# Patient Record
Sex: FEMALE | Race: WHITE | Employment: FULL TIME | URBAN - METROPOLITAN AREA
[De-identification: names, ages, dates, MRNs, and addresses within clinical notes are randomized per-mention and may not be internally consistent; named-entity substitution may affect disease eponyms.]

---

## 2017-03-13 ENCOUNTER — TELEPHONE (OUTPATIENT)
Dept: NURSING | Facility: CLINIC | Age: 57
End: 2017-03-13

## 2017-03-13 DIAGNOSIS — R68.82 LOW LIBIDO: Primary | ICD-10-CM

## 2017-03-13 NOTE — TELEPHONE ENCOUNTER
Spoke with Josie Rosales in person, she stated she ok's the 3 month supply and stated it was for low libido. Rx added to pt's med list.

## 2017-03-13 NOTE — TELEPHONE ENCOUNTER
Testosterone 4mg + DHEA 30mg/gm cream (1/2gram or 10 clicks once a day in the morning)      Last Written Prescription Date: Not in pt's electronic chart, pharmacist stated old Rx dated 9/15/16  Last Fill Quantity: 1 month,   # refills: 3 refills  Last Office Visit with [facility] [place] care provider:  5/9/16  Future Office visit: 5/12/17    Heather Callback: [phone] ext [phone] ok to  is a secure voicemail    Routing refill request to provider for review/approval because:  45g is 3 month supply. Pt is leaving the country tomorrow and needs this refill today if able. Note routed to Josie Huff to refill for pt for 3 month supply?

## 2017-05-12 ENCOUNTER — OFFICE VISIT (OUTPATIENT)
Dept: OBGYN | Facility: CLINIC | Age: 57
End: 2017-05-12
Payer: COMMERCIAL

## 2017-05-12 ENCOUNTER — RADIANT APPOINTMENT (OUTPATIENT)
Dept: MAMMOGRAPHY | Facility: CLINIC | Age: 57
End: 2017-05-12
Payer: COMMERCIAL

## 2017-05-12 VITALS
HEIGHT: 63 IN | BODY MASS INDEX: 24.98 KG/M2 | DIASTOLIC BLOOD PRESSURE: 74 MMHG | SYSTOLIC BLOOD PRESSURE: 112 MMHG | WEIGHT: 141 LBS

## 2017-05-12 DIAGNOSIS — Z12.31 VISIT FOR SCREENING MAMMOGRAM: ICD-10-CM

## 2017-05-12 DIAGNOSIS — Z13.29 SCREENING FOR THYROID DISORDER: ICD-10-CM

## 2017-05-12 DIAGNOSIS — Z01.419 ENCOUNTER FOR GYNECOLOGICAL EXAMINATION WITHOUT ABNORMAL FINDING: Primary | ICD-10-CM

## 2017-05-12 DIAGNOSIS — H69.93 EUSTACHIAN TUBE DYSFUNCTION, BILATERAL: ICD-10-CM

## 2017-05-12 DIAGNOSIS — Z13.220 ENCOUNTER FOR LIPID SCREENING FOR CARDIOVASCULAR DISEASE: ICD-10-CM

## 2017-05-12 DIAGNOSIS — Z13.228 SCREENING FOR METABOLIC DISORDER: ICD-10-CM

## 2017-05-12 DIAGNOSIS — R68.82 LOW LIBIDO: ICD-10-CM

## 2017-05-12 DIAGNOSIS — Z13.6 ENCOUNTER FOR LIPID SCREENING FOR CARDIOVASCULAR DISEASE: ICD-10-CM

## 2017-05-12 DIAGNOSIS — Z79.890 POST-MENOPAUSE ON HRT (HORMONE REPLACEMENT THERAPY): ICD-10-CM

## 2017-05-12 DIAGNOSIS — M85.80 OSTEOPENIA, SENILE: ICD-10-CM

## 2017-05-12 LAB
ALBUMIN SERPL-MCNC: 4.4 G/DL (ref 3.4–5)
ALP SERPL-CCNC: 61 U/L (ref 40–150)
ALT SERPL W P-5'-P-CCNC: 38 U/L (ref 0–50)
ANION GAP SERPL CALCULATED.3IONS-SCNC: 7 MMOL/L (ref 3–14)
AST SERPL W P-5'-P-CCNC: 25 U/L (ref 0–45)
BILIRUB SERPL-MCNC: 0.7 MG/DL (ref 0.2–1.3)
BUN SERPL-MCNC: 15 MG/DL (ref 7–30)
CALCIUM SERPL-MCNC: 9.2 MG/DL (ref 8.5–10.1)
CHLORIDE SERPL-SCNC: 106 MMOL/L (ref 94–109)
CHOLEST SERPL-MCNC: 255 MG/DL
CO2 SERPL-SCNC: 26 MMOL/L (ref 20–32)
CREAT SERPL-MCNC: 0.69 MG/DL (ref 0.52–1.04)
GFR SERPL CREATININE-BSD FRML MDRD: 87 ML/MIN/1.7M2
GLUCOSE SERPL-MCNC: 96 MG/DL (ref 70–99)
HDLC SERPL-MCNC: 79 MG/DL
LDLC SERPL CALC-MCNC: 158 MG/DL
NONHDLC SERPL-MCNC: 176 MG/DL
POTASSIUM SERPL-SCNC: 4.3 MMOL/L (ref 3.4–5.3)
PROT SERPL-MCNC: 8.1 G/DL (ref 6.8–8.8)
SODIUM SERPL-SCNC: 139 MMOL/L (ref 133–144)
TRIGL SERPL-MCNC: 88 MG/DL
TSH SERPL DL<=0.005 MIU/L-ACNC: 0.75 MU/L (ref 0.4–4)

## 2017-05-12 PROCEDURE — 99396 PREV VISIT EST AGE 40-64: CPT | Performed by: OBSTETRICS & GYNECOLOGY

## 2017-05-12 PROCEDURE — 80061 LIPID PANEL: CPT | Performed by: OBSTETRICS & GYNECOLOGY

## 2017-05-12 PROCEDURE — G0202 SCR MAMMO BI INCL CAD: HCPCS | Mod: TC

## 2017-05-12 PROCEDURE — 80053 COMPREHEN METABOLIC PANEL: CPT | Performed by: OBSTETRICS & GYNECOLOGY

## 2017-05-12 PROCEDURE — 84443 ASSAY THYROID STIM HORMONE: CPT | Performed by: OBSTETRICS & GYNECOLOGY

## 2017-05-12 PROCEDURE — 36415 COLL VENOUS BLD VENIPUNCTURE: CPT | Performed by: OBSTETRICS & GYNECOLOGY

## 2017-05-12 ASSESSMENT — ANXIETY QUESTIONNAIRES
2. NOT BEING ABLE TO STOP OR CONTROL WORRYING: NOT AT ALL
7. FEELING AFRAID AS IF SOMETHING AWFUL MIGHT HAPPEN: NOT AT ALL
GAD7 TOTAL SCORE: 0
5. BEING SO RESTLESS THAT IT IS HARD TO SIT STILL: NOT AT ALL
6. BECOMING EASILY ANNOYED OR IRRITABLE: NOT AT ALL
IF YOU CHECKED OFF ANY PROBLEMS ON THIS QUESTIONNAIRE, HOW DIFFICULT HAVE THESE PROBLEMS MADE IT FOR YOU TO DO YOUR WORK, TAKE CARE OF THINGS AT HOME, OR GET ALONG WITH OTHER PEOPLE: NOT DIFFICULT AT ALL
3. WORRYING TOO MUCH ABOUT DIFFERENT THINGS: NOT AT ALL
1. FEELING NERVOUS, ANXIOUS, OR ON EDGE: NOT AT ALL

## 2017-05-12 ASSESSMENT — PATIENT HEALTH QUESTIONNAIRE - PHQ9: 5. POOR APPETITE OR OVEREATING: NOT AT ALL

## 2017-05-12 NOTE — PROGRESS NOTES
Elva is a 57 year old (+4) female who presents for annual exam.     Besides routine health maintenance, she would like to discuss bilateral pressure in ears and has had this for several months. Had full workup with ENT approx. 2 years ago.    HPI:    The patient does not have a primary care provider.    Patient has been seeing Dr. Ivey for years but all of her sisters and some nieces come to see me so finally transitioned to me when our clinics merged.  Patient has been on compounded HRT for several years. Struggled for 3 yrs with nightsweats and insomnia and low libido. Within 2 days of starting these creams she was sleeping through the night and felt like a new person. Was going to compound Rx with [facility] but that clinic closed down and all the pts were funneled to a compounding pharmacy in [place]. uche has filled her compound rx for her and the pharmacy just contacts him. Patient does her 2 estrogen/progesterone cream BID (5 clicks) and her DHEA/testosterone cream once a day. Doesn't need refills now but if contacted would like to know if I would fill that for her.  Patient has had episodes in the past where will get a really full feeling in her ears and head. Feels like wants to pop her ears. Sometimes will get the to pop but not really any relief from it. Saw ent a few years ago and nothing found. Then has been fine for a couple of years and now started again recently. Has always just had a fear of a brain tumor and so when she gets these sx it's all she can think about.  Has mild osteopenia on her last dexa 2 yrs ago. Dr. ivey recommended she do one every other year so should be due. Wondering if needs to do that or not.  Fasting for lab work today. Her last cholesterol was deemed high but in reality was normal. Patient states she was never told that fasting labs are more sensitive and so has never fasted for choleterol before until today.  Patient has a derm appointment after this. Does a  deep dermabrasion every few months that works really well but is very bloody and sore for about a week after it.    GYNECOLOGIC HISTORY:    No LMP recorded. Patient is postmenopausal.  She  reports that she has never smoked. She does not have any smokeless tobacco history on file.  Patient is sexually active.  STD testing offered?  Declined    Last PHQ-9 score on record =   PHQ-9 SCORE 2017   Total Score 0     Last GAD7 score on record =   GENNY-7 SCORE 2017   Total Score 0     Alcohol Score = 3    HEALTH MAINTENANCE:  Cholesterol: fasting today for labs  Cholesterol   Date Value Ref Range Status   2017 255 (H) <200 mg/dL Final     Comment:     Desirable:       <200 mg/dl   2016 198 <200 mg/dL Final   Last Mammo: 16, Result: normal, Next Mammo: today   Pap:  Lab Results   Component Value Date    PAP NIL 2016    PAP NIL 2015   Colonoscopy:  , Result: normal, Next Colonoscopy:   Dexa:  4/30/15  Mild Osteopenia  Spine -1.3  Left -1.0  Right -1.3  Health maintenance updated:  yes    HISTORY:  Obstetric History       T0      TAB0   SAB0   E0   M0   L4       # Outcome Date GA Lbr Jayden/2nd Weight Sex Delivery Anes PTL Lv   4 Para      Vag-Spont   Y   3 Para         Y   2 Para         Y   1 Para         Y      Obstetric Comments   Has four kids of her own. Has one stepdaughter from her first  that was 5 when they got together and is her oldest and still very involved with her. Has 3 stepkids from her current . Remarried when she was 45       Patient Active Problem List   Diagnosis     Post-menopause on HRT (hormone replacement therapy)     Osteopenia, senile     Past Surgical History:   Procedure Laterality Date     ABDOMINOPLASTY  2009     COSMETIC BLEPHAROPLASTY FOUR LIDS       SLING TRANSVAGINAL N/A 10/16/2014    Dr Abdul      Social History   Substance Use Topics     Smoking status: Never Smoker     Smokeless tobacco: Not on file  "    Alcohol use 3.5 oz/week     7 Standard drinks or equivalent per week      Problem (# of Occurrences) Relation (Name,Age of Onset)    Breast Cancer (1) Maternal Grandmother    CANCER (1) Sister: GI    DIABETES (2) Mother, Father            Current Outpatient Prescriptions   Medication Sig     NEW MED Estriol 1.6mg / Estradiol 1.6mg / Progesterone 200mg (2x) / Testosterone 4mg: twice daily, 5 clicks.     NEW MED Testosterone 4mg + DHEA 30mg/gm cream   1/2gram or 10 clicks once a day in the morning     calcium carbonate (OS-NOEL 500 MG [place]. [place]) 500 MG tablet Take 500 mg by mouth daily     Krill Oil 1000 MG CAPS Take 1 capsule by mouth daily     Ceres 550 MG CAPS Take 1 capsule by mouth daily     MULTIVITAMINS OR TABS ONE DAILY     BONE DENSITY 300-200 MG-UNIT OR TABS 2 Qd     No current facility-administered medications for this visit.      No Known Allergies    Past medical, surgical, social and family histories were reviewed and updated in EPIC.    ROS:   12 point review of systems negative other than symptoms noted below.  Head: Earache and Sore Throat  Musculoskeletal: Joint Pain    EXAM:  /74  Ht 5' 3\" (1.6 m)  Wt 141 lb (64 kg)  BMI 24.98 kg/m2   BMI: Body mass index is 24.98 kg/(m^2).    PHYSICAL EXAM:  Constitutional:  Appearance: Well nourished, well developed, alert, in no acute distress  Neck:  Lymph Nodes:  No lymphadenopathy present    Thyroid:  Gland size normal, nontender, no nodules or masses present  on palpation  Chest:  Respiratory Effort:  Breathing unlabored  Cardiovascular:    Heart: Auscultation:  Regular rate, normal rhythm, no murmurs present  Breasts: Inspection of Breasts:  No lymphadenopathy present    Palpation of Breasts and Axillae:  No masses present on palpation, no  breast tenderness    Axillary Lymph Nodes:  No lymphadenopathy present  Gastrointestinal:   Abdominal Examination:  Abdomen nontender to palpation, tone normal without rigidity or guarding, no masses " present, umbilicus without lesions   Liver and Spleen:  No hepatomegaly present, liver nontender to palpation    Hernias:  No hernias present  Lymphatic: Lymph Nodes:  No other lymphadenopathy present  Skin:  General Inspection:  No rashes present, no lesions present, no areas of  discoloration    Genitalia and Groin:  No rashes present, no lesions present, no areas of  discoloration, no masses present  Neurologic/Psychiatric:    Mental Status:  Oriented X3     Pelvic Exam:  External Genitalia:     Normal appearance for age, no discharge present, no tenderness present, no inflammatory lesions present, color normal  Vagina:     Normal vaginal vault without central or paravaginal defects, no discharge present, no inflammatory lesions present, no masses present  Bladder:     Nontender to palpation  Urethra:   Urethral Body:  Urethra palpation normal, urethra structural support normal   Urethral Meatus:  No erythema or lesions present  Cervix:     Appearance healthy, no lesions present, nontender to palpation, no bleeding present  Uterus:     Nontender to palpation, no masses present, position anteflexed, mobility: normal  Adnexa:     No adnexal tenderness present, no adnexal masses present  Perineum:     Perineum within normal limits, no evidence of trauma, no rashes or skin lesions present  Anus:     Anus within normal limits, no hemorrhoids present  Inguinal Lymph Nodes:     No lymphadenopathy present  Pubic Hair:     Normal pubic hair distribution for age  Genitalia and Groin:     No rashes present, no lesions present, no areas of discoloration, no masses present    COUNSELING:   Reviewed preventive health counseling, as reflected in patient instructions  Special attention given to:        ([place])menopause management    BMI: Body mass index is 24.98 kg/(m^2).      ASSESSMENT:  57 year old female with satisfactory annual exam.    ICD-10-CM    1. Encounter for gynecological examination without abnormal finding Z01.419     2. Post-menopause on HRT (hormone replacement therapy) Z79.890    3. Osteopenia, senile M85.80    4. Eustachian tube dysfunction, bilateral H69.83    5. Low libido R68.82    6. Encounter for lipid screening for cardiovascular disease Z13.220 Lipid panel reflex to direct LDL    Z13.6    7. Screening for metabolic disorder Z13.228 Comprehensive metabolic panel   8. Screening for thyroid disorder Z13.29 TSH with free T4 reflex       PLAN:  Pap is UTD and mammo today  Fasting labs today to see how it compares to her prior nonfasting labs  Discussed her osteopenia as very mild. Don't see a need to do a dexa this year with the T scores as they are. Would recommend about 3-5 years from the last one. She thinks she'd like to repeat it again next year b/c starting a new weight bearing, more intense exercise regimen now and would like to see if that helps at all.  Spent a long time discussing menopause, HRT, compounded hormones, risk/safety. Patient was sure that compounded hormones came only from a yam source and therefore were safe and carried none of the risk of FDA monitored HRT. Patient was informed that this isn't the case we just simply don't have the FDA safety data on compounded HRT. i'm fine with her staying on it since it's really helping and she's happy with it. Also given name of other compounding pharmacies in [place] to get it through so doen't have to mess with going out of state through [place]. Patient needs to be aware that there is risk to all HRT regardless of the type and that as long as she is aware of those risks she can continue with it. Patient would like to do so now that she is aware.    Angy Solis MD

## 2017-05-12 NOTE — MR AVS SNAPSHOT
"              After Visit Summary   5/12/2017    Elva Peters    MRN: [id number]           Patient Information     Date Of Birth          1960        Visit Information        Provider Department      5/12/2017 9:00 AM Angy Solis MD [facility]        Today's Diagnoses     Encounter for gynecological examination without abnormal finding    -  1    Post-menopause on HRT (hormone replacement therapy)        Osteopenia, senile        Eustachian tube dysfunction, bilateral        Low libido        Encounter for lipid screening for cardiovascular disease        Screening for metabolic disorder        Screening for thyroid disorder           Follow-ups after your visit        Who to contact     If you have questions or need follow up information about today's clinic visit or your schedule please contact [facility] directly at [phone].  Normal or non-critical lab and imaging results will be communicated to you by [employer], letter or phone within 4 business days after the clinic has received the results. If you do not hear from us within 7 days, please contact the clinic through [employer] or phone. If you have a critical or abnormal lab result, we will notify you by phone as soon as possible.  Submit refill requests through [employer] or call your pharmacy and they will forward the refill request to us. Please allow 3 business days for your refill to be completed.          Additional Information About Your Visit        MyChart Information     [employer] lets you send messages to your doctor, view your test results, renew your prescriptions, schedule appointments and more. To sign up, go to [email or web address] . Click on \"Log in\" on the left side of the screen, which will take you to the Welcome page. Then click on \"Sign up Now\" on the right side of the page.     You will be asked to enter the access code listed below, as well as some personal information. Please follow the " "directions to create your username and password.     Your access code is: [email or web address]  Expires: 2017  9:50 AM     Your access code will  in 90 days. If you need help or a new code, please call your [facility] clinic or [phone].        Care EveryWhere ID     This is your Care EveryWhere ID. This could be used by other organizations to access your [facility] medical records  [id number]        Your Vitals Were     Height BMI (Body Mass Index)                5' 3\" (1.6 m) 24.98 kg/m2           Blood Pressure from Last 3 Encounters:   17 112/74   16 110/64   04/30/15 110/68    Weight from Last 3 Encounters:   17 141 lb (64 kg)   16 138 lb 3.2 oz (62.7 kg)   04/30/15 140 lb (63.5 kg)              We Performed the Following     Comprehensive metabolic panel     Lipid panel reflex to direct LDL     TSH with free T4 reflex        Primary Care Provider Office Phone # Fax #    Ashok Abdul [phone]       [facility] [address]  [facility] [address]        Thank you!     Thank you for choosing [facility]  for your care. Our goal is always to provide you with excellent care. Hearing back from our patients is one way we can continue to improve our services. Please take a few minutes to complete the written survey that you may receive in the mail after your visit with us. Thank you!             Your Updated Medication List - Protect others around you: Learn how to safely use, store and throw away your medicines at www.disposemymeds.org.          This list is accurate as of: 17 11:59 PM.  Always use your most recent med list.                   Brand Name Dispense Instructions for use    BONE DENSITY 300-200 MG-UNIT Tabs      2 Qd       calcium carbonate 500 MG tablet    OS-NOEL 500 mg [place]. Ca     Take 500 mg by mouth daily       [place] 550 MG Caps      Take 1 capsule by mouth daily       Krill Oil 1000 MG Caps      " Take 1 capsule by mouth daily       multivitamin per tablet     100    ONE DAILY       * NEW MED      Estriol 1.6mg / Estradiol 1.6mg / Progesterone 200mg (2x) / Testosterone 4mg: twice daily, 5 clicks.       * NEW MED     45 g    Testosterone 4mg + DHEA 30mg/gm cream  1/2gram or 10 clicks once a day in the morning       * Notice:  This list has 2 medication(s) that are the same as other medications prescribed for you. Read the directions carefully, and ask your doctor or other care provider to review them with you.

## 2017-05-13 ASSESSMENT — PATIENT HEALTH QUESTIONNAIRE - PHQ9: SUM OF ALL RESPONSES TO PHQ QUESTIONS 1-9: 0

## 2017-05-13 ASSESSMENT — ANXIETY QUESTIONNAIRES: GAD7 TOTAL SCORE: 0

## 2017-05-16 ENCOUNTER — TELEPHONE (OUTPATIENT)
Dept: NURSING | Facility: CLINIC | Age: 57
End: 2017-05-16

## 2017-05-16 DIAGNOSIS — Z86.39 H/O ELEVATED LIPIDS: Primary | ICD-10-CM

## 2017-05-16 NOTE — TELEPHONE ENCOUNTER
Pt informed. Will return for fasting blood work in 4-5 months. Lab order entered.     Notes Recorded by Angy Solis MD on 5/12/2017 at 9:31 PM  Thyroid and metabolic panel look great. Her cholesterol total and LDL are dramatically higher than last year which is unusual b/c she was fasting this year and not last. It seems somewhat atypical for such a steep increase when no other health changes have happened. Let's have her repeat fasting lipids only in 4-5 months and just see if it's more consistent with this year's or last years. If truly elevated we will address at that time.

## 2017-05-23 ENCOUNTER — MYC MEDICAL ADVICE (OUTPATIENT)
Dept: OBGYN | Facility: CLINIC | Age: 57
End: 2017-05-23

## 2017-05-23 NOTE — TELEPHONE ENCOUNTER
Routing pt's Mango DSP message to Dr. Mendoza (on-call) (Dr. Solis in surgery today) to review and advise.

## 2017-05-23 NOTE — TELEPHONE ENCOUNTER
This is a patient I am not familiar with and don't feel I can make the call on who to have her see next so please forward this on to Dr Solis

## 2017-05-24 NOTE — TELEPHONE ENCOUNTER
I'd start with Andrea Crisostomo ENT in our building for more ENT related w/u. If he rules out anything in his perview than I would refer to either Dr. Debo Arguello or Dr. Boateng of neurology ( I think they're different clinics so maybe just check insurance to make sure anyone she sees is in network. I truly don't think anything serious is going on and I don't think it's related to the cholesterol. I really think that these are separate things. But let's start ruling out one thing and then another until we can figure it out.

## 2017-10-31 ENCOUNTER — TELEPHONE (OUTPATIENT)
Dept: OBGYN | Facility: CLINIC | Age: 57
End: 2017-10-31

## 2017-10-31 NOTE — TELEPHONE ENCOUNTER
Pt states [facility] is out of network for her, she states she will still see Dr. Solis for her care but wants labs/mammograms done through [facility]. Pt requested order for cholesterol be faxed to  [facility] fax: [phone].  Order faxed as requested.

## 2017-10-31 NOTE — TELEPHONE ENCOUNTER
Reason for Call:  Other call back    Detailed comments: patient wants her lab orders sent to [facility] because [facility] is out of network    Phone Number Patient can be reached at: Cell number on file:    Telephone Information:   Mobile [phone]       Best Time: today    Can we leave a detailed message on this number? YES    Call taken on 10/31/2017 at 10:57 AM by Adry Montesinos

## 2017-11-16 ENCOUNTER — TRANSFERRED RECORDS (OUTPATIENT)
Dept: HEALTH INFORMATION MANAGEMENT | Facility: CLINIC | Age: 57
End: 2017-11-16

## 2017-11-16 LAB
CHOLEST SERPL-MCNC: 244 MG/DL (ref 0–199)
HDLC SERPL-MCNC: 63 MG/DL
LDLC SERPL CALC-MCNC: 163 MG/DL (ref 19–130)
TRIGL SERPL-MCNC: 88 MG/DL (ref 4–149)

## 2017-11-27 ENCOUNTER — TELEPHONE (OUTPATIENT)
Dept: OBGYN | Facility: CLINIC | Age: 57
End: 2017-11-27

## 2017-11-27 NOTE — TELEPHONE ENCOUNTER
Pt calling stating she had her lab (Lipid Panel) drawn on 11/16/17 at the [facility] in [place] ([phone]) and looking for results. No lab results scanned into media or on lab tab and was not viewable via Care Everywhere but after hanging out telephone call with [facility] the results are now viewable along with Testosterone Lab. Routing to Dr. Solis for review/advise lab results and plan.

## 2017-11-27 NOTE — TELEPHONE ENCOUNTER
Lab results from robyn rosenberg that was sent over to [facility].  Pt has not heard the results yet.     Please call back to discuss.

## 2017-11-28 NOTE — TELEPHONE ENCOUNTER
Pt informed. Pt states she feels she is eating very healthy, never has firied foods and limits carbs. Pt is exercising 6 days per week. Pt agrees with loosing 10 pounds and is working on that. Pt stated Dr. Solis is not in pt's network so pt would rather have labs done at [facility] to save cost. Pt wants to continue to see Dr. Solis,  so has to pay out of pocket. Routing to Dr. Solis. HELENA

## 2017-11-28 NOTE — TELEPHONE ENCOUNTER
Her testosterone is fine so is ok to stay on her current dose.    Her lipids are actually slightly worse. Her HDl or good cholest is good so that's partly what's bumping her total and also helping to make her ratio ok. However her LDL with us was 158 and now is 163. Ideal is under 130. We usually don't do medication at this level, and wait until closer to 180s before doing meds now. However we definitely take FHx and her comorbidieits into account when making that choice.    Given she's healthy and at a fairly healthy BMI I would start meds at this point. However she should work on trying to cut down on carbs and fried foods, could do some garlique and (i think she does fish oil already but that can help). Even though her weight is really pretty healthy sometimes even a very small weight loss of 7-10# can help with lipids. Would rec repeat in 6 months again so could do it with us when back for annual in spring. Def should do fasting only.

## 2017-12-22 DIAGNOSIS — R68.82 LOW LIBIDO: ICD-10-CM

## 2017-12-22 NOTE — TELEPHONE ENCOUNTER
Testosterone 4mg/DHEA 30mg/GM Cream      Last Written Prescription Date:  3/13/17  Last Fill Quantity: 45,   # refills: 0  Last Office Visit: 5/12/17  Future Office visit:   none    Routing refill request to provider for review/approval because:  Drug not on the [facility], [facility] or [facility] refill protocol or controlled substance

## 2017-12-22 NOTE — TELEPHONE ENCOUNTER
Second request for BI-EST (50/50) 3.5MG/PRGESTERONE 185MG/GM CREAM. Previuosly managed by Dr. Abdul.

## 2017-12-26 NOTE — TELEPHONE ENCOUNTER
Rx's signed and were brought up by MA to . Rx's can be faxed, called and asked pt where to send. Pt wanted Rx's sent to pharmacy [facility]. Rx'd as requested.

## 2018-05-21 ENCOUNTER — TELEPHONE (OUTPATIENT)
Dept: OBGYN | Facility: CLINIC | Age: 58
End: 2018-05-21

## 2018-07-18 ENCOUNTER — TELEPHONE (OUTPATIENT)
Dept: OBGYN | Facility: CLINIC | Age: 58
End: 2018-07-18

## 2018-07-18 DIAGNOSIS — Z13.820 OSTEOPOROSIS SCREENING: ICD-10-CM

## 2018-07-18 DIAGNOSIS — Z12.31 ENCOUNTER FOR SCREENING MAMMOGRAM FOR MALIGNANT NEOPLASM OF BREAST: Primary | ICD-10-CM

## 2018-07-18 DIAGNOSIS — M85.80 OSTEOPENIA, SENILE: ICD-10-CM

## 2018-07-18 DIAGNOSIS — Z13.228 SCREENING FOR METABOLIC DISORDER: ICD-10-CM

## 2018-07-18 DIAGNOSIS — Z13.21 ENCOUNTER FOR VITAMIN DEFICIENCY SCREENING: ICD-10-CM

## 2018-07-18 DIAGNOSIS — Z13.220 SCREENING, LIPID: ICD-10-CM

## 2018-07-18 NOTE — TELEPHONE ENCOUNTER
Yes, ok to do that.  Dx for dexa is osteopenia  Lipids-hyperlipidemia(mixed)  Cmp(screen lipids)  Vitamin d(screen vit deficiency, osteopenia)

## 2018-07-18 NOTE — TELEPHONE ENCOUNTER
Pt would like to have dexa, mammo and fasting labs orders sent to [facility]. OK to send? Routing to Dr. Solis. What labs do you want? Lipids, CMP?

## 2018-07-19 NOTE — TELEPHONE ENCOUNTER
Pt informed  Orders for labs sent to [phone]  Order for dexa scan sent to [phone]  Order for mammogram sent to [phone]. Copy of report from past mammogram sent. Pt notified [facility] and [facility] will [facility] of [facility].

## 2018-07-24 NOTE — TELEPHONE ENCOUNTER
Pt states lab did not receive orders. Informed were sent and confirmed that they were received. Call [facility] [place] to confirm fax number. Orders resent. Pt informed.

## 2018-07-25 ENCOUNTER — TRANSFERRED RECORDS (OUTPATIENT)
Dept: HEALTH INFORMATION MANAGEMENT | Facility: CLINIC | Age: 58
End: 2018-07-25

## 2018-07-25 NOTE — TELEPHONE ENCOUNTER
Orders still not received by lab. Called and spoke to [facility] and they stated they have been having problesm with their fax. Given different number to try. [phone]. Fax recevied per right fax. Pt informed. Pt requesting copy of orders . Pt will  today. Call [facility] [place] and was told they are having problems with their fax. Given different number to fax orders. Pt informed. Pt would still like copy of orders. Placed at .

## 2018-07-26 ENCOUNTER — MEDICAL CORRESPONDENCE (OUTPATIENT)
Dept: HEALTH INFORMATION MANAGEMENT | Facility: CLINIC | Age: 58
End: 2018-07-26

## 2018-07-31 ENCOUNTER — TRANSFERRED RECORDS (OUTPATIENT)
Dept: HEALTH INFORMATION MANAGEMENT | Facility: CLINIC | Age: 58
End: 2018-07-31

## 2018-08-08 ENCOUNTER — OFFICE VISIT (OUTPATIENT)
Dept: OBGYN | Facility: CLINIC | Age: 58
End: 2018-08-08
Payer: COMMERCIAL

## 2018-08-08 VITALS
SYSTOLIC BLOOD PRESSURE: 128 MMHG | BODY MASS INDEX: 25.62 KG/M2 | HEART RATE: 76 BPM | HEIGHT: 63 IN | WEIGHT: 144.6 LBS | DIASTOLIC BLOOD PRESSURE: 78 MMHG

## 2018-08-08 DIAGNOSIS — Z79.890 POST-MENOPAUSE ON HRT (HORMONE REPLACEMENT THERAPY): ICD-10-CM

## 2018-08-08 DIAGNOSIS — R07.89 SENSATION OF CHEST TIGHTNESS: ICD-10-CM

## 2018-08-08 DIAGNOSIS — M85.80 OSTEOPENIA, SENILE: ICD-10-CM

## 2018-08-08 DIAGNOSIS — Z01.419 ENCOUNTER FOR GYNECOLOGICAL EXAMINATION WITHOUT ABNORMAL FINDING: Primary | ICD-10-CM

## 2018-08-08 DIAGNOSIS — L65.9 HAIR LOSS: ICD-10-CM

## 2018-08-08 DIAGNOSIS — R25.1 TREMOR OF BOTH HANDS: ICD-10-CM

## 2018-08-08 PROCEDURE — 99212 OFFICE O/P EST SF 10 MIN: CPT | Mod: 25 | Performed by: OBSTETRICS & GYNECOLOGY

## 2018-08-08 PROCEDURE — 99396 PREV VISIT EST AGE 40-64: CPT | Performed by: OBSTETRICS & GYNECOLOGY

## 2018-08-08 ASSESSMENT — ANXIETY QUESTIONNAIRES
1. FEELING NERVOUS, ANXIOUS, OR ON EDGE: NOT AT ALL
7. FEELING AFRAID AS IF SOMETHING AWFUL MIGHT HAPPEN: NOT AT ALL
5. BEING SO RESTLESS THAT IT IS HARD TO SIT STILL: NOT AT ALL
GAD7 TOTAL SCORE: 1
IF YOU CHECKED OFF ANY PROBLEMS ON THIS QUESTIONNAIRE, HOW DIFFICULT HAVE THESE PROBLEMS MADE IT FOR YOU TO DO YOUR WORK, TAKE CARE OF THINGS AT HOME, OR GET ALONG WITH OTHER PEOPLE: NOT DIFFICULT AT ALL
6. BECOMING EASILY ANNOYED OR IRRITABLE: NOT AT ALL
3. WORRYING TOO MUCH ABOUT DIFFERENT THINGS: SEVERAL DAYS
2. NOT BEING ABLE TO STOP OR CONTROL WORRYING: NOT AT ALL

## 2018-08-08 ASSESSMENT — PATIENT HEALTH QUESTIONNAIRE - PHQ9: 5. POOR APPETITE OR OVEREATING: NOT AT ALL

## 2018-08-08 NOTE — PROGRESS NOTES
Elva is a 58 year old  female who presents for annual exam.     Besides routine health maintenance,  she would like to discuss different concerns. On and off back pain and chest tightness for past 2 months. Concern with hair loss. Concern with slight constant tremor in the hands for past few months. .    HPI:  The patient's PCP is Angy Delgado MD.    Patient was seen by myself last year after seeing Dr. Abdul for years. Her pap smear history is very atypical and unclear what was being done for her. Last pap was 4/15 and then had just HPV w/o pap in 2016 so interval isn't normal. Both were normal in their respective years.    Patient has been doing compounded HRT for yrs through compounding clinic. Dr. Abdul was rx'ing it for her so I have taken that over. Have discussed both last year and this year the unknown nature of what she is taking and it's level of endometrial protection on progestin compared to estrogen as well as the testosterone. Patient is aware of this but feels more comfortable doing it. Actually recently stopped it to see if noticed a difference and isn't really sure that she did so may at this point stay off for awhile.    Has been noticing a lot of hair loss. Feels like it's falling out in clumps and really thinning. Taking vitamins and nothing else different in her diet or exercise regimen. Did start about 2 weeks after stopped her HRT    Has also had a hand tremor. Seems to be whether stressed, rested, fatigued, hungry, etc. Mild but definitely bilateral and noticeable. No tremor anywhere else. No other neurologic type sx    Final concern is that having some chest tightness and in to her back. Is happening mostly when at rest. Exercises a lot and it doesn't worsen then. No SOB, no palpitation, lightheadedness or dizziness. It is across the top of her chest and into her right arm on occasion. Is exercising a lot so keeps wondering if just sore muscles or something more and doesn't  want to miss anything.    Patient's insurance only covers [facility] so has to have all of her imaging and appointments there. Has chose to see me for her gyn provider and pay OOP for that b/c her whole family sees me and she'd prefer to stay in our clinic. Any referrals though will likely have to be through [facility]      GYNECOLOGIC HISTORY:    No LMP recorded. Patient is postmenopausal.  Her current contraception method is: menopause.  She  reports that she has quit smoking. She has never used smokeless tobacco.    Patient is sexually active.  STD testing offered?  Declined  Last PHQ-9 score on record =   PHQ-9 SCORE 2018   Total Score 2     Last GAD7 score on record =   GENNY-7 SCORE 2018   Total Score 1     Alcohol Score = 3    HEALTH MAINTENANCE:  Cholesterol: 17   Total= 244, Triglycerides=88, HDL=63, TAJ=988    Cholesterol   Date Value Ref Range Status   2017 244 (A) 0 - 199 mg/dL Final   2017 255 (H) <200 mg/dL Final     Comment:     Desirable:       <200 mg/dl      Last Mammo: 18, Result: normal, Next Mammo: this year  Pap: HPV: 4/30/15 Negative  Lab Results   Component Value Date    PAP NIL 2016    PAP NIL 2015      Colonoscopy:  1/1/10 per patient, Result: normal, Next Colonoscopy: .  Dexa:  never    Health maintenance updated:  yes    HISTORY:  Obstetric History       T0      L4     SAB0   TAB0   Ectopic0   Multiple0   Live Births4       # Outcome Date GA Lbr Jayden/2nd Weight Sex Delivery Anes PTL Lv   4 Para      Vag-Spont   CHAITANYA   3 Para         CHAITANYA   2 Para         CHAITANYA   1 Para         CHAITANYA      Obstetric Comments   Has four kids of her own. Has one stepdaughter from her first  that was 5 when they got together and is her oldest and still very involved with her. Has 3 stepkids from her current . Remarried when she was 45       Patient Active Problem List   Diagnosis     Post-menopause on HRT (hormone replacement  "therapy)     Osteopenia, senile     Past Surgical History:   Procedure Laterality Date     ABDOMINOPLASTY  01/2009     COSMETIC BLEPHAROPLASTY FOUR LIDS       SLING TRANSVAGINAL N/A 10/16/2014    Dr Abdul      Social History   Substance Use Topics     Smoking status: Former Smoker     Smokeless tobacco: Never Used     Alcohol use 3.5 oz/week     7 Standard drinks or equivalent per week      Problem (# of Occurrences) Relation (Name,Age of Onset)    Breast Cancer (1) Maternal Grandmother    Cancer (1) Sister: GI    Diabetes (2) Mother, Father            Current Outpatient Prescriptions   Medication Sig     COLLAGEN PO Take 1 tablet by mouth     COMPOUND CONTAINING CONTROLLED SUBSTANCE (CMPD RX) - PHARMACY TO MIX COMPOUNDED MEDICATION BI-EST (50/50) 3.5MG/PROGESTERONE 185MG/GM CREAM  Apply 0.25gm (5clicks) topically to thin skinned area twice daily, rub in well rotate sites     GARLIC PO Take 1 tablet by mouth     Agra 550 MG CAPS Take 1 capsule by mouth daily     Krill Oil 1000 MG CAPS Take 1 capsule by mouth daily     Multiple Minerals-Vitamins (BONE DENSITY BUILDER PO) Take 1 tablet by mouth     MULTIVITAMINS OR TABS ONE DAILY     NEW MED Testosterone 4mg + DHEA 30mg/gm cream   1/2gram or 10 clicks once a day in the morning     NEW MED Estriol 1.6mg / Estradiol 1.6mg / Progesterone 200mg (2x) / Testosterone 4mg: twice daily, 5 clicks.     Omega-3 Fatty Acids (EPA) 1000 MG CAPS Take 1 capsule by mouth     No current facility-administered medications for this visit.      No Known Allergies    Past medical, surgical, social and family histories were reviewed and updated in EPIC.    ROS:   12 point review of systems negative other than symptoms noted below.  Breast: Tenderness  Genitourinary: No Periods  Musculoskeletal: Joint Pain  Endocrine: Decreased Libido    EXAM:  /78  Pulse 76  Ht 5' 3\" (1.6 m)  Wt 144 lb 9.6 oz (65.6 kg)  Breastfeeding? No  BMI 25.61 kg/m2   BMI: Body mass index is 25.61 " kg/(m^2).    PHYSICAL EXAM:  Constitutional:  Appearance: Well nourished, well developed, alert, in no acute distress  Neck:  Lymph Nodes:  No lymphadenopathy present    Thyroid:  Gland size normal, nontender, no nodules or masses present  on palpation  Chest:  Respiratory Effort:  Breathing unlabored  Cardiovascular:    Heart: Auscultation:  Regular rate, normal rhythm, no murmurs present  Breasts: Palpation of Breasts and Axillae:  No masses present on palpation, no breast tenderness. and No nodularity, asymmetry or nipple discharge bilaterally.  Gastrointestinal:   Abdominal Examination:  Abdomen nontender to palpation, tone normal without rigidity or guarding, no masses present, umbilicus without lesions   Liver and Spleen:  No hepatomegaly present, liver nontender to palpation    Hernias:  No hernias present  Lymphatic: Lymph Nodes:  No other lymphadenopathy present  Skin:  General Inspection:  No rashes present, no lesions present, no areas of  discoloration    Genitalia and Groin:  No rashes present, no lesions present, no areas of  discoloration, no masses present  Neurologic/Psychiatric:    Mental Status:  Oriented X3     Pelvic Exam:  External Genitalia:     Normal appearance for age, no discharge present, no tenderness present, no inflammatory lesions present, color normal  Vagina:     Normal vaginal vault without central or paravaginal defects, ATROPHIC  Bladder:     Nontender to palpation  Urethra:   Urethral Body:  Urethra palpation normal, urethra structural support normal   Urethral Meatus:  No erythema or lesions present  Cervix:     Appearance healthy, no lesions present, nontender to palpation, no bleeding present  Uterus:     Nontender to palpation, no masses present, position anteflexed, mobility: normal  Adnexa:     No adnexal tenderness present, no adnexal masses present  Perineum:     Perineum within normal limits, no evidence of trauma, no rashes or skin lesions present  Inguinal Lymph  Nodes:     No lymphadenopathy present      COUNSELING:   Reviewed preventive health counseling, as reflected in patient instructions  Special attention given to:        Regular exercise       Osteoporosis Prevention/Bone Health       (Sydnee)menopause management    BMI: Body mass index is 25.61 kg/(m^2).      ASSESSMENT:  58 year old female with satisfactory annual exam.    ICD-10-CM    1. Encounter for gynecological examination without abnormal finding Z01.419 Pap imaged thin layer screen with HPV - recommended age 30 - 65     HPV High Risk Types DNA Cervical   2. Osteopenia, senile M85.80    3. Post-menopause on HRT (hormone replacement therapy) Z79.890    4. Tremor of both hands R25.1    5. Hair loss L65.9    6. Sensation of chest tightness R07.89        PLAN:  Chose to do both pap and HPV today so as to sync them both up at once and her last pap was 3 yrs ago w/o HPV so technically would be due based on that  mammo already done at [facility]  Last dexa was 3+ yrs ago with mild penia and patient is now off her hrt. Would recommend repeat dexa and can do that with us or with [facility]  Spent an additional 10 min addressing the hair loss, hrt, hand tremors and chest tightness. The latter doesn't seem cardiac in nature. Either musculoskeletal or even anxiety but have referred her to get a cardiology or internist appointment at [facility] to f/u on that  The hand tremor is very mild. Likely is just essential tremor as not other signs for parkinsons but have again, referred her to see neurology  Discussed her hair loss. Had her thyroid checked last year and normal. Does coincide with stopping her HRT and patient never put that together, so could very well be that. Discussed the pros/cons of normal HRT and hair loss and then the compounded unknown safety HRT she's taking and all the potential risks vs benefits mostly on hair since not having vasomotor sx  Will likely restart her HRT b/c the hair loss is bother her enough and  see if helps but if not much difference may choose to go off of it again.    Angy Solis MD

## 2018-08-08 NOTE — MR AVS SNAPSHOT
After Visit Summary   8/8/2018    Elva Peters    MRN: [id number]           Patient Information     Date Of Birth          1960        Visit Information        Provider Department      8/8/2018 3:00 PM Angy Solis MD [facility]        Today's Diagnoses     Encounter for gynecological examination without abnormal finding    -  1    Osteopenia, senile        Post-menopause on HRT (hormone replacement therapy)        Tremor of both hands        Hair loss        Sensation of chest tightness           Follow-ups after your visit        Who to contact     If you have questions or need follow up information about today's clinic visit or your schedule please contact [facility] directly at [phone].  Normal or non-critical lab and imaging results will be communicated to you by MyChart, letter or phone within 4 business days after the clinic has received the results. If you do not hear from us within 7 days, please contact the clinic through ClickMechanichart or phone. If you have a critical or abnormal lab result, we will notify you by phone as soon as possible.  Submit refill requests through InviBox or call your pharmacy and they will forward the refill request to us. Please allow 3 business days for your refill to be completed.          Additional Information About Your Visit        MyChart Information     InviBox gives you secure access to your electronic health record. If you see a primary care provider, you can also send messages to your care team and make appointments. If you have questions, please call your primary care clinic.  If you do not have a primary care provider, please call [phone] and they will assist you.        Care EveryWhere ID     This is your Care EveryWhere ID. This could be used by other organizations to access your [place] medical records  [id number]        Your Vitals Were     Pulse Height Breastfeeding? BMI (Body Mass Index)        "   76 5' 3\" (1.6 m) No 25.61 kg/m2         Blood Pressure from Last 3 Encounters:   08/08/18 128/78   07/18/18 112/70   05/12/17 112/74    Weight from Last 3 Encounters:   08/08/18 144 lb 9.6 oz (65.6 kg)   07/18/18 140 lb 9.6 oz (63.8 kg)   05/12/17 141 lb (64 kg)              We Performed the Following     HPV High Risk Types DNA Cervical     Pap imaged thin layer screen with HPV - recommended age 30 - 65        Primary Care Provider Office Phone # Fax #    Ashok Abdul [phone]       XXX RETIRED XXX [address] [address]        Equal Access to Services     WESLEY POMPA : Hadii gaby gongora hadasho Soquinn, waaxda luqadaha, qaybta kaalmada adeegyafanta, dioni peacock . So [facility] [phone].    ATENCIÓN: Si habla español, tiene a hooper disposición servicios gratuitos de asistencia lingüística. Llame al [phone].    We comply with applicable federal civil rights laws and [place] laws. We do not discriminate on the basis of race, color, national origin, age, disability, sex, sexual orientation, or gender identity.            Thank you!     Thank you for choosing [facility] FOR [facility]  for your care. Our goal is always to provide you with excellent care. Hearing back from our patients is one way we can continue to improve our services. Please take a few minutes to complete the written survey that you may receive in the mail after your visit with us. Thank you!             Your Updated Medication List - Protect others around you: Learn how to safely use, store and throw away your medicines at www.disposemymeds.org.          This list is accurate as of 8/8/18 11:59 PM.  Always use your most recent med list.                   Brand Name Dispense Instructions for use Diagnosis    BONE DENSITY BUILDER PO      Take 1 tablet by mouth        COLLAGEN PO      Take 1 tablet by mouth        COMPOUND CONTAINING CONTROLLED SUBSTANCE - PHARMACY TO MIX " COMPOUNDED MEDICATION    CMPD RX    30 g    BI-EST (50/50) 3.5MG/PROGESTERONE 185MG/GM CREAM Apply 0.25gm (5clicks) topically to thin skinned area twice daily, rub in well rotate sites    Low libido       EPA 1000 MG Caps      Take 1 capsule by mouth        GARLIC PO      Take 1 tablet by mouth        [place]ec 550 MG Caps      Take 1 capsule by mouth daily        Krill Oil 1000 MG Caps      Take 1 capsule by mouth daily        multivitamin per tablet     100    ONE DAILY        * NEW MED      Estriol 1.6mg / Estradiol 1.6mg / Progesterone 200mg (2x) / Testosterone 4mg: twice daily, 5 clicks.        * NEW MED     45 g    Testosterone 4mg + DHEA 30mg/gm cream  1/2gram or 10 clicks once a day in the morning    Low libido       * Notice:  This list has 2 medication(s) that are the same as other medications prescribed for you. Read the directions carefully, and ask your doctor or other care provider to review them with you.

## 2018-08-10 ASSESSMENT — ANXIETY QUESTIONNAIRES: GAD7 TOTAL SCORE: 1

## 2018-08-10 ASSESSMENT — PATIENT HEALTH QUESTIONNAIRE - PHQ9: SUM OF ALL RESPONSES TO PHQ QUESTIONS 1-9: 2

## 2018-08-22 DIAGNOSIS — R68.82 LOW LIBIDO: ICD-10-CM

## 2018-08-22 NOTE — TELEPHONE ENCOUNTER
Requested Prescriptions   Pending Prescriptions Disp Refills     NEW MED 45 g 0     Sig: Testosterone 4mg + DHEA 30mg/gm cream   1/2gram or 10 clicks once a day in the morning    There is no refill protocol information for this order        Last Written Prescription Date:  12/22/17  Last Fill Quantity: 45 g,  # refills: 0   Last office visit: 8/8/2018 with prescribing provider:  Dr Solis for annual   Future Office Visit:  None

## 2018-08-22 NOTE — TELEPHONE ENCOUNTER
LMTCB to discuss if still taking.       Discussed her hair loss. Had her thyroid checked last year and normal. Does coincide with stopping her HRT and patient never put that together, so could very well be that. Discussed the pros/cons of normal HRT and hair loss and then the compounded unknown safety HRT she's taking and all the potential risks vs benefits mostly on hair since not having vasomotor sx  Will likely restart her HRT b/c the hair loss is bother her enough and see if helps but if not much difference may choose to go off of it again.

## 2018-08-24 NOTE — TELEPHONE ENCOUNTER
NEW MED 45 g 0        Sig: Testosterone 4mg + DHEA 30mg/gm cream   1/2gram or 10 clicks once a day in the morning     There is no refill protocol information for this order          Last Written Prescription Date:  12/22/17  Last Fill Quantity: 45 g,  # refills: 0   Last office visit: 8/8/2018 with prescribing provider:  Dr Solis for annual   Future Office Visit:  None  Drug not on the [facility] or [facility] refill protocol or controlled substance. Routing to Dr. Frias, on call.

## 2018-08-29 ENCOUNTER — TRANSFERRED RECORDS (OUTPATIENT)
Dept: HEALTH INFORMATION MANAGEMENT | Facility: CLINIC | Age: 58
End: 2018-08-29

## 2018-09-07 ENCOUNTER — TELEPHONE (OUTPATIENT)
Dept: OBGYN | Facility: CLINIC | Age: 58
End: 2018-09-07

## 2018-09-07 NOTE — TELEPHONE ENCOUNTER
----- Message from Angy Solis MD sent at 9/7/2018  8:37 AM CDT -----  I got a copy of Elva's dexa from [facility].  Only one hip was done which I usually recommend doing both in case there's a big difference between them but her score was -1.8. However her spine was -2.1 so more severe osteopenia. Don't think we have to do treatment at this point but the rec on her dexa was to repeat in 4 yrs. I just want to have someone call her and tell her that I would not rec waiting 4 yrs given the spine scores but would prefer she repeat in aug of 2020.  Can we call and let her know that?  thx  AJ

## 2018-09-07 NOTE — TELEPHONE ENCOUNTER
Called and spoke to patient regarding Dr Solis' message below, stating to repeat Dexa in 2 years (Aug 2020) versus 4 like they had recommended due to her results. Patient understood and will follow through with that plan.

## 2018-10-18 ENCOUNTER — TRANSFERRED RECORDS (OUTPATIENT)
Dept: HEALTH INFORMATION MANAGEMENT | Facility: CLINIC | Age: 58
End: 2018-10-18

## 2018-10-24 ENCOUNTER — OFFICE VISIT (OUTPATIENT)
Dept: OBGYN | Facility: CLINIC | Age: 58
End: 2018-10-24
Payer: COMMERCIAL

## 2018-10-24 VITALS — SYSTOLIC BLOOD PRESSURE: 120 MMHG | WEIGHT: 143.6 LBS | DIASTOLIC BLOOD PRESSURE: 74 MMHG | BODY MASS INDEX: 25.44 KG/M2

## 2018-10-24 DIAGNOSIS — N76.2 ATROPHIC VULVITIS: Primary | ICD-10-CM

## 2018-10-24 DIAGNOSIS — N94.89 VAGINAL BURNING: ICD-10-CM

## 2018-10-24 LAB
ALBUMIN UR-MCNC: NEGATIVE MG/DL
APPEARANCE UR: CLEAR
BACTERIA #/AREA URNS HPF: ABNORMAL /HPF
BILIRUB UR QL STRIP: NEGATIVE
COLOR UR AUTO: YELLOW
GLUCOSE UR STRIP-MCNC: NEGATIVE MG/DL
HGB UR QL STRIP: NEGATIVE
KETONES UR STRIP-MCNC: NEGATIVE MG/DL
LEUKOCYTE ESTERASE UR QL STRIP: NEGATIVE
NITRATE UR QL: NEGATIVE
PH UR STRIP: 7.5 PH (ref 5–7)
RBC #/AREA URNS AUTO: ABNORMAL /HPF
SOURCE: ABNORMAL
SP GR UR STRIP: 1.01 (ref 1–1.03)
UROBILINOGEN UR STRIP-ACNC: 0.2 EU/DL (ref 0.2–1)
WBC #/AREA URNS AUTO: ABNORMAL /HPF

## 2018-10-24 PROCEDURE — 81001 URINALYSIS AUTO W/SCOPE: CPT | Performed by: OBSTETRICS & GYNECOLOGY

## 2018-10-24 PROCEDURE — 99213 OFFICE O/P EST LOW 20 MIN: CPT | Performed by: OBSTETRICS & GYNECOLOGY

## 2018-10-24 RX ORDER — ESTRADIOL 0.1 MG/G
CREAM VAGINAL
Qty: 42.5 G | Refills: 3 | Status: SHIPPED | OUTPATIENT
Start: 2018-10-24

## 2018-10-24 NOTE — MR AVS SNAPSHOT
After Visit Summary   10/24/2018    Elva Peters    MRN: [id number]           Patient Information     Date Of Birth          1960        Visit Information        Provider Department      10/24/2018 4:10 PM Angy Solis MD [facility]        Today's Diagnoses     Atrophic vulvitis    -  1    Vaginal burning           Follow-ups after your visit        Who to contact     If you have questions or need follow up information about today's clinic visit or your schedule please contact [facility] directly at [phone].  Normal or non-critical lab and imaging results will be communicated to you by Sweet P'shart, letter or phone within 4 business days after the clinic has received the results. If you do not hear from us within 7 days, please contact the clinic through Vontut or phone. If you have a critical or abnormal lab result, we will notify you by phone as soon as possible.  Submit refill requests through Schoolwires or call your pharmacy and they will forward the refill request to us. Please allow 3 business days for your refill to be completed.          Additional Information About Your Visit        MyChart Information     Schoolwires gives you secure access to your electronic health record. If you see a primary care provider, you can also send messages to your care team and make appointments. If you have questions, please call your primary care clinic.  If you do not have a primary care provider, please call [phone] and they will assist you.        Care EveryWhere ID     This is your Care EveryWhere ID. This could be used by other organizations to access your [place] medical records  [id number]        Your Vitals Were     Breastfeeding? BMI (Body Mass Index)                No 25.44 kg/m2           Blood Pressure from Last 3 Encounters:   10/24/18 120/74   08/08/18 128/78   07/18/18 112/70    Weight from Last 3 Encounters:   10/24/18 143 lb 9.6 oz (65.1 kg)    08/08/18 144 lb 9.6 oz (65.6 kg)   07/18/18 140 lb 9.6 oz (63.8 kg)              We Performed the Following     UA with Microscopic          Today's Medication Changes          These changes are accurate as of 10/24/18  4:56 PM.  If you have any questions, ask your nurse or doctor.               Start taking these medicines.        Dose/Directions    estradiol 0.1 MG/GM cream   Commonly known as:  ESTRACE VAGINAL   Used for:  Atrophic vulvitis   Started by:  Angy Solis MD        Apply quarter sized amount of cream 3x/week to the affected area. Can use 1/2 gram vaginally 2-3x/week as well   Quantity:  42.5 g   Refills:  3            Where to get your medicines      These medications were sent to [facility] [address] [place] [address] 42 AT [facility] [address] [address]    Hours:  24-hours Phone:  [phone]     estradiol 0.1 MG/GM cream                Primary Care Provider Office Phone # Fax #    Ashok Abdul [phone]       XXX RETIRED XXX [address] [address]        Equal Access to Services     JAMISON POMPA AH: Hadii gaby gongora hadasho Soabelali, waaxda luqadaha, qaybta kaalmada adeegyada, dioni mancini. So [facility] [phone].    ATENCIÓN: Si habla español, tiene a hooper disposición servicios gratuitos de asistencia lingüística. Llame al [phone].    We comply with applicable federal civil rights laws and [place] laws. We do not discriminate on the basis of race, color, national origin, age, disability, sex, sexual orientation, or gender identity.            Thank you!     Thank you for choosing [facility]  for your care. Our goal is always to provide you with excellent care. Hearing back from our patients is one way we can continue to improve our services. Please take a few minutes to complete the written survey that you may receive in the mail after  your visit with us. Thank you!             Your Updated Medication List - Protect others around you: Learn how to safely use, store and throw away your medicines at www.disposemymeds.org.          This list is accurate as of 10/24/18  4:56 PM.  Always use your most recent med list.                   Brand Name Dispense Instructions for use Diagnosis    BONE DENSITY BUILDER PO      Take 1 tablet by mouth        COLLAGEN PO      Take 1 tablet by mouth        COMPOUND CONTAINING CONTROLLED SUBSTANCE - PHARMACY TO MIX COMPOUNDED MEDICATION    CMPD RX    30 g    BI-EST (50/50) 3.5MG/PROGESTERONE 185MG/GM CREAM Apply 0.25gm (5clicks) topically to thin skinned area twice daily, rub in well rotate sites    Low libido       EPA 1000 MG Caps      Take 1 capsule by mouth        estradiol 0.1 MG/GM cream    ESTRACE VAGINAL    42.5 g    Apply quarter sized amount of cream 3x/week to the affected area. Can use 1/2 gram vaginally 2-3x/week as well    Atrophic vulvitis       GARLIC PO      Take 1 tablet by mouth        [place] 550 MG Caps      Take 1 capsule by mouth daily        Krill Oil 1000 MG Caps      Take 1 capsule by mouth daily        multivitamin per tablet     100    ONE DAILY        NEW MED      Estriol 1.6mg / Estradiol 1.6mg / Progesterone 200mg (2x) / Testosterone 4mg: twice daily, 5 clicks.        NEW MED     45 g    Testosterone 4mg + DHEA 30mg/gm cream  1/2gram or 10 clicks once a day in the morning    Low libido

## 2018-10-24 NOTE — PROGRESS NOTES
SUBJECTIVE:                                                   Elva Peters is a 58 year old female who presents to clinic today for the following health issue(s):  Patient presents with:  Vaginal Problem        HPI:  Has had several months of a very specific spot on her lower right vagina/vulva that burns and hurts and feels like there's sometimes a sore even.  She feels like it is much worse during I.C but that its there generally all day. Sometimes will feel like can't sit for long time with pants on that are too tight  Always uses a lubricant for sex and hasn't changed that or brand  Hasn't had any recent abx for anything  No itching or vaginal discharge  Is back on her compounded HRT again  Had this issues in august when here to see me as well but didn't mention it b/c thought it would pass    No LMP recorded. Patient is postmenopausal..   Patient is sexually active, .  Using menopause for contraception.    reports that she has quit smoking. She has never used smokeless tobacco.    STD testing offered?  Declined    Health maintenance updated:  yes    Today's PHQ-2 Score:   PHQ-2 (  [employer]) 2015   Q1: Little interest or pleasure in doing things 0   Q2: Feeling down, depressed or hopeless 0   PHQ-2 Score 0     Today's PHQ-9 Score:   PHQ-9 SCORE 2018   Total Score 2     Today's GENNY-7 Score:   GENNY-7 SCORE 2018   Total Score 1       Problem list and histories reviewed & adjusted, as indicated.  Additional history: as documented.    Patient Active Problem List   Diagnosis     Post-menopause on HRT (hormone replacement therapy)     Osteopenia, senile     Past Surgical History:   Procedure Laterality Date     ABDOMINOPLASTY  2009     COSMETIC BLEPHAROPLASTY FOUR LIDS       SLING TRANSVAGINAL N/A 10/16/2014    Dr Abdul      Social History   Substance Use Topics     Smoking status: Former Smoker     Smokeless tobacco: Never Used      Comment: Quit      Alcohol use 3.5 oz/week     7  Standard drinks or equivalent per week      Problem (# of Occurrences) Relation (Name,Age of Onset)    Breast Cancer (1) Maternal Grandmother    Cancer (1) Sister: GI    Diabetes (2) Mother, Father            Current Outpatient Prescriptions   Medication Sig     COLLAGEN PO Take 1 tablet by mouth     COMPOUND CONTAINING CONTROLLED SUBSTANCE (CMPD RX) - PHARMACY TO MIX COMPOUNDED MEDICATION BI-EST (50/50) 3.5MG/PROGESTERONE 185MG/GM CREAM  Apply 0.25gm (5clicks) topically to thin skinned area twice daily, rub in well rotate sites     estradiol (ESTRACE VAGINAL) 0.1 MG/GM cream Apply quarter sized amount of cream 3x/week to the affected area. Can use 1/2 gram vaginally 2-3x/week as well     GARLIC PO Take 1 tablet by mouth     [place] 550 MG CAPS Take 1 capsule by mouth daily     Krill Oil 1000 MG CAPS Take 1 capsule by mouth daily     Multiple Minerals-Vitamins (BONE DENSITY BUILDER PO) Take 1 tablet by mouth     MULTIVITAMINS OR TABS ONE DAILY     NEW MED Testosterone 4mg + DHEA 30mg/gm cream   1/2gram or 10 clicks once a day in the morning     NEW MED Estriol 1.6mg / Estradiol 1.6mg / Progesterone 200mg (2x) / Testosterone 4mg: twice daily, 5 clicks.     Omega-3 Fatty Acids (EPA) 1000 MG CAPS Take 1 capsule by mouth     No current facility-administered medications for this visit.      No Known Allergies    ROS:  12 point review of systems negative other than symptoms noted below.  Gastrointestinal: Heartburn  Genitourinary: Painful Sidney    OBJECTIVE:     /74  Wt 143 lb 9.6 oz (65.1 kg)  Breastfeeding? No  BMI 25.44 kg/m2  Body mass index is 25.44 kg/(m^2).    Exam:  Pelvic Exam:  External Genitalia:     Normal appearance for age, no discharge present, no tenderness present, no inflammatory lesions present, color normal  Vagina:     Normal vaginal vault without central or paravaginal defects, ATROPHIC, IN AREA OF HER PAIN IT IS SLIGHTLY ATROPHIC OVER A SUTURE LINE FROM OBSTETRIC TEAR. NO FISSURES,  LESIONS, BLISTERS, BUMPS, CYSTS  Bladder:     Nontender to palpation  Urethra:   Urethral Body:  Urethra palpation normal, urethra structural support normal   Urethral Meatus:  No erythema or lesions present  Cervix:     Appearance healthy, no lesions present, nontender to palpation, no bleeding present  Uterus:     Nontender to palpation, no masses present, position anteflexed, mobility: normal  Adnexa:     No adnexal tenderness present, no adnexal masses present  Perineum:     Perineum within normal limits, no evidence of trauma, no rashes or skin lesions present  Inguinal Lymph Nodes:     No lymphadenopathy present       In-Clinic Test Results:  Results for orders placed or performed in visit on 10/24/18 (from the past 24 hour(s))   UA with Microscopic   Result Value Ref Range    Color Urine Yellow     Appearance Urine Clear     Glucose Urine Negative NEG^Negative mg/dL    Bilirubin Urine Negative NEG^Negative    Ketones Urine Negative NEG^Negative mg/dL    Specific Gravity Urine 1.015 1.003 - 1.035    pH Urine 7.5 (H) 5.0 - 7.0 pH    Protein Albumin Urine Negative NEG^Negative mg/dL    Urobilinogen Urine 0.2 0.2 - 1.0 EU/dL    Nitrite Urine Negative NEG^Negative    Blood Urine Negative NEG^Negative    Leukocyte Esterase Urine Negative NEG^Negative    Source Midstream Urine     WBC Urine 0 - 5 OTO5^0 - 5 /HPF    RBC Urine O - 2 OTO2^O - 2 /HPF    Bacteria Urine Few (A) NEG^Negative /HPF       ASSESSMENT/PLAN:                                                        ICD-10-CM    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 Vaginal burning N94.9 UA with Microscopic         The area of burning is very site specific. It's not flared right now but can definitely feel it. Tried to feel for it herself b/c sometimes very tender to touch and wasn't today.  It is in the area of a bartholin gland cyst but there isn't one. Differential is atrophic vulvitis, intermittently flared bartholin gland  cyst, herpes, L.S with fissuring but none of those things are actively showing today    Will treat for atrophic vulvitis with estrace. Discussed using just topically in the area of discomfort 3x/week and/or could just typically with 1/2 gram vaginally in the applicator 2-3x/week for general vaginal dryness.  Will give satinder  Month and see how she feels.  If has an active flare will try to call for a same day appointment to eval what it might be  Could have vestibulitis b/c it is in that area but it is so unilateral and specific location that this seems less likely. Could consider elavil or neurontin as well if the estrace doesn't help    Angy Solis MD  [facility] [place]

## 2018-12-11 DIAGNOSIS — R68.82 LOW LIBIDO: ICD-10-CM

## 2018-12-11 NOTE — TELEPHONE ENCOUNTER
Requested Prescriptions   Pending Prescriptions Disp Refills     NEW MED 45 g 3     Sig: Testosterone 4mg + DHEA 30mg/gm cream   1/2gram or 10 clicks once a day in the morning    There is no refill protocol information for this order        Last Written Prescription Date:  08/24/18  Last Fill Quantity: 45 g,  # refills: 0   Last office visit: 10/24/2018 with prescribing provider:  Dr Solis   Future Office Visit:  None

## 2018-12-12 ENCOUNTER — TELEPHONE (OUTPATIENT)
Dept: OBGYN | Facility: CLINIC | Age: 58
End: 2018-12-12

## 2018-12-12 NOTE — TELEPHONE ENCOUNTER
Pharmacy called about testosterone refill. Informed that we are waiting for physician to address.

## 2019-03-07 DIAGNOSIS — R68.82 LOW LIBIDO: ICD-10-CM

## 2019-10-02 ENCOUNTER — HEALTH MAINTENANCE LETTER (OUTPATIENT)
Age: 59
End: 2019-10-02

## 2021-01-15 ENCOUNTER — HEALTH MAINTENANCE LETTER (OUTPATIENT)
Age: 61
End: 2021-01-15

## 2021-09-04 ENCOUNTER — HEALTH MAINTENANCE LETTER (OUTPATIENT)
Age: 61
End: 2021-09-04

## 2022-02-19 ENCOUNTER — HEALTH MAINTENANCE LETTER (OUTPATIENT)
Age: 62
End: 2022-02-19

## 2022-10-22 ENCOUNTER — HEALTH MAINTENANCE LETTER (OUTPATIENT)
Age: 62
End: 2022-10-22

## 2023-04-01 ENCOUNTER — HEALTH MAINTENANCE LETTER (OUTPATIENT)
Age: 63
End: 2023-04-01

## 2024-01-11 NOTE — TELEPHONE ENCOUNTER
Requested Prescriptions   Pending Prescriptions Disp Refills     COMPOUND CONTAINING CONTROLLED SUBSTANCE (CMPD RX) - PHARMACY TO MIX COMPOUNDED MEDICATION 30 g 3     Sig: BI-EST (50/50) 3.5MG/PROGESTERONE 185MG/GM CREAM  Apply 0.25gm (5clicks) topically to thin skinned area twice daily, rub in well rotate sites    There is no refill protocol information for this order        Last Written Prescription Date:  12/22/17  Last Fill Quantity: 30g,  # refills: 3   Last office visit: 10/24/2018 with prescribing provider:  Will Lockhart Office Visit: none      Routing to provider  Arabella Fraire RN on 3/7/2019 at 8:45 AM       4 = No assist / stand by assistance
